# Patient Record
Sex: FEMALE | Race: WHITE
[De-identification: names, ages, dates, MRNs, and addresses within clinical notes are randomized per-mention and may not be internally consistent; named-entity substitution may affect disease eponyms.]

---

## 2018-01-16 ENCOUNTER — HOSPITAL ENCOUNTER (EMERGENCY)
Dept: HOSPITAL 25 - UCCORT | Age: 48
Discharge: HOME | End: 2018-01-16
Payer: COMMERCIAL

## 2018-01-16 ENCOUNTER — HOSPITAL ENCOUNTER (OUTPATIENT)
Dept: HOSPITAL 25 - ED | Age: 48
Discharge: HOME | End: 2018-01-16
Attending: UROLOGY
Payer: COMMERCIAL

## 2018-01-16 VITALS — SYSTOLIC BLOOD PRESSURE: 131 MMHG | DIASTOLIC BLOOD PRESSURE: 88 MMHG

## 2018-01-16 VITALS — SYSTOLIC BLOOD PRESSURE: 146 MMHG | DIASTOLIC BLOOD PRESSURE: 94 MMHG

## 2018-01-16 DIAGNOSIS — R11.0: ICD-10-CM

## 2018-01-16 DIAGNOSIS — N13.2: Primary | ICD-10-CM

## 2018-01-16 DIAGNOSIS — R50.9: ICD-10-CM

## 2018-01-16 DIAGNOSIS — Z32.02: ICD-10-CM

## 2018-01-16 DIAGNOSIS — R03.0: ICD-10-CM

## 2018-01-16 DIAGNOSIS — R10.32: ICD-10-CM

## 2018-01-16 LAB
BASOPHILS # BLD AUTO: 0 10^3/UL (ref 0–0.2)
EOSINOPHIL # BLD AUTO: 0 10^3/UL (ref 0–0.6)
HCT VFR BLD AUTO: 38 % (ref 35–47)
HGB BLD-MCNC: 13 G/DL (ref 12–16)
LYMPHOCYTES # BLD AUTO: 1.1 10^3/UL (ref 1–4.8)
MCH RBC QN AUTO: 30 PG (ref 27–31)
MCHC RBC AUTO-ENTMCNC: 34 G/DL (ref 31–36)
MCV RBC AUTO: 88 FL (ref 80–97)
MONOCYTES # BLD AUTO: 0.5 10^3/UL (ref 0–0.8)
NEUTROPHILS # BLD AUTO: 6.6 10^3/UL (ref 1.5–7.7)
NRBC # BLD AUTO: 0 10^3/UL
NRBC BLD QL AUTO: 0.1
PLATELET # BLD AUTO: 293 10^3/UL (ref 150–450)
RBC # BLD AUTO: 4.34 10^6/UL (ref 4–5.4)
WBC # BLD AUTO: 8.3 10^3/UL (ref 3.5–10.8)

## 2018-01-16 PROCEDURE — G0463 HOSPITAL OUTPT CLINIC VISIT: HCPCS

## 2018-01-16 PROCEDURE — 74018 RADEX ABDOMEN 1 VIEW: CPT

## 2018-01-16 PROCEDURE — 86140 C-REACTIVE PROTEIN: CPT

## 2018-01-16 PROCEDURE — 84702 CHORIONIC GONADOTROPIN TEST: CPT

## 2018-01-16 PROCEDURE — 96376 TX/PRO/DX INJ SAME DRUG ADON: CPT

## 2018-01-16 PROCEDURE — 80053 COMPREHEN METABOLIC PANEL: CPT

## 2018-01-16 PROCEDURE — 36415 COLL VENOUS BLD VENIPUNCTURE: CPT

## 2018-01-16 PROCEDURE — 83605 ASSAY OF LACTIC ACID: CPT

## 2018-01-16 PROCEDURE — 81003 URINALYSIS AUTO W/O SCOPE: CPT

## 2018-01-16 PROCEDURE — 74177 CT ABD & PELVIS W/CONTRAST: CPT

## 2018-01-16 PROCEDURE — 74420 UROGRAPHY RTRGR +-KUB: CPT

## 2018-01-16 PROCEDURE — 85025 COMPLETE CBC W/AUTO DIFF WBC: CPT

## 2018-01-16 PROCEDURE — 99202 OFFICE O/P NEW SF 15 MIN: CPT

## 2018-01-16 PROCEDURE — C1876 STENT, NON-COA/NON-COV W/DEL: HCPCS

## 2018-01-16 PROCEDURE — 99283 EMERGENCY DEPT VISIT LOW MDM: CPT

## 2018-01-16 PROCEDURE — 96374 THER/PROPH/DIAG INJ IV PUSH: CPT

## 2018-01-16 NOTE — RAD
INDICATION: Left renal stent placement



COMPARISON: None

 

TECHNIQUE: 2 views the abdomen were obtained.



FINDINGS: 



The left ureteral stent is anatomically aligned. The proximal most portion of the ureteral

stent is looped within a left lower pole calyx. Overlying the proximal ureter there is a 7

mm calcification corresponding to the numerous calcifications seen on the preoperative

CTA.



IMPRESSION: 

ANATOMIC ALIGNMENT OF LEFT URETERAL STENT. 

INCIDENTALLY NOTED IS THE PROXIMAL PORTION OF THE STENT IS LOOPED WITHIN A LOWER POLE

CALYX.

## 2018-01-16 NOTE — UC
Abdominal Pain Female HPI





- HPI Summary


HPI Summary: 





LEFT SIDE ABDOMINAL PAIN X 4 DAYS


NO RADIATION OF PAIN 


PAIN IS SEVER, WORSE WITH LYING DOWN, 


+ NAUSEA , NO VOMITING, NO URINARY SX








- History of Current Complaint


Chief Complaint: UCAbdominalPain


Stated Complaint: ABD PAIN


Time Seen by Provider: 01/16/18 11:24


Hx Obtained From: Patient


Hx Last Menstrual Period: 1/12/18


Pregnant?: No


Onset/Duration: Gradual Onset, Lasting Days - 4, Still Present


Timing: Constant


Severity Initially: Moderate


Severity Currently: Severe


Location: Discrete At: LLQ


Radiates: No


Character: Colicy


Aggravating Factor(s): Food, Movement


Alleviating Factor(s): Nothing


Associated Signs and Symptoms: Positive: Decreased Appetite, Nausea.  Negative: 

Diaphoresis, Fever, Cough, Chest Pain, Dizzy, Back Pain, Constipation, Blood in 

Stool, Urinary Symptoms, Vaginal Bleeding, Vaginal Discharge, Vomiting, Diarrhea


Allergies/Adverse Reactions: 


 Allergies











Allergy/AdvReac Type Severity Reaction Status Date / Time


 


No Known Allergies Allergy   Verified 01/16/18 11:12











Home Medications: 


 Home Medications





Butalb/Acetamin/Caff TAB* [Fioricet TAB*] 1 tab PO Q6H PRN 01/16/18 [History 

Confirmed 01/16/18]


Digestive Bitters  01/16/18 [History]


Tretinoin [Retin-A] 0.1 % EX 01/16/18 [History]


Vit B12  01/16/18 [History]











PMH/Surg Hx/FS Hx/Imm Hx


Previously Healthy: Yes





- Surgical History


Surgical History: Yes


Surgery Procedure, Year, and Place: UTERINE ABLATION





- Family History


Known Family History: 


   Negative: Blood Disorder





- Social History


Alcohol Use: None


Substance Use Type: None


Smoking Status (MU): Never Smoked Tobacco





Review of Systems


Constitutional: Negative


Skin: Negative


Eyes: Negative


ENT: Negative


Respiratory: Negative


Gastrointestinal: Abdominal Pain, Nausea


Genitourinary: Negative


Is Patient Immunocompromised?: No


All Other Systems Reviewed And Are Negative: Yes





Physical Exam


Triage Information Reviewed: Yes


Appearance: Well-Nourished, Pain Distress


Vital Signs: 


 Initial Vital Signs











Temp  99.6 F   01/16/18 11:15


 


Pulse  78   01/16/18 11:15


 


Resp  16   01/16/18 11:15


 


BP  146/94   01/16/18 11:15


 


Pulse Ox  100   01/16/18 11:15











Vital Signs Reviewed: Yes


Eyes: Positive: Conjunctiva Clear


ENT: Positive: Normal ENT inspection, Hearing grossly normal, Pharynx normal


Neck exam: Normal


Neck: Positive: Supple, Nontender, No Lymphadenopathy


Respiratory: Positive: Chest non-tender, Lungs clear, Normal breath sounds


Cardiovascular: Positive: RRR, No Murmur, Pulses Normal


Abdomen Description: Positive: Soft, Other: - TENDERNESS LLQ.  Negative: CVA 

Tenderness (R), CVA Tenderness (L), Distended, Guarding


Bowel Sounds: Positive: Present


Musculoskeletal: Positive: Strength Intact, ROM Intact, No Edema


Skin Exam: Normal





Diagnostics





- Laboratory


Diagnostic Studies Completed/Ordered: CT ABD/PELVIC.  1. Severe LEFT 

hydronephrosis is traced to a 0.9 cm ureteral pelvic junction stone.  

Associated perinephric inflammatory stranding and delayed LEFT nephrogram and 

pyelogram.  Additional 0.5 cm LEFT mid pole calyceal stone and 0.5 cm lower 

pole calyceal stones.  2. Consider nonemergent RIGHT upper quadrant ultrasound 

to further assess the noted.  relative low suspicion focal hepatic lesions most 

likely representing cysts or.  hemangiomas.





Abd Pain Female Course/Dx





- Course


Course Of Treatment: WILL HAVE THE PT. GO TO [facility] FOR EVAL AND TX





- Differential Dx/Diagnosis


Provider Diagnoses: LEFT RENAL COLIC.  HYDRONEPHROSIS LEFT KIDNEY





Discharge





- Discharge Plan


Condition: Stable


Disposition: HOME


Patient Education Materials:  Renal Colic (ED), Hydronephrosis (ED)


Referrals: 


Rebeca Nieves MD [Primary Care Provider] - 


Additional Instructions: 


CT ABD/ PELVIC :


1. Severe LEFT hydronephrosis is traced to a 0.9 cm ureteral pelvic junction 

stone.


Associated perinephric inflammatory stranding and delayed LEFT nephrogram and 

pyelogram.


Additional 0.5 cm LEFT mid pole calyceal stone and 0.5 cm lower pole calyceal 

stones.


2. Consider nonemergent RIGHT upper quadrant ultrasound to further assess the 

noted


relative low suspicion focal hepatic lesions most likely representing cysts or


hemangiomas.











PLEASE GO TO [facility] FOR EVAL AND TX OF KIDNEY STONE LEFT SIDE , 


LARGE STONE 9 MM , + FLUID IN YOUR LEFT KIDNEY DUE TO OBSTRUCTION

## 2018-01-16 NOTE — RAD
CPT II Codes: 6045F



INDICATION: Left-sided hydronephrosis presence of multiple renal calculi

 

TECHNIQUE: Intraoperative fluoroscopy was provided during retrograde nephrostogram and

left ureteral stent placement.



FINDINGS: 



9 spot films depict retrograde nephrostogram, multiple renal calculi in the proximal

ureter and collecting system as well as moderate to severe hydronephrosis. The proximal

half left ureteral stent is anatomically positioned.



Fluoroscopy time: 10 Seconds



IMPRESSION:  



As above.

## 2018-01-16 NOTE — RAD
INDICATION: RIGHT lower quadrant pain. Question diverticulitis. Nausea and vomiting.

Fever. Bloating.



COMPARISON: No relevant prior exams available on the [facility] PACS for comparison.



TECHNIQUE: Multidetector CT images were obtained from the lung bases to the ischial

tuberosities with 71 mL Omnipaque 300 IV and oral contrast.  Multiplanar reformation.



REPORT: Unremarkable visualized inferior thorax.



1.2 cm maximum dimension sharply circumscribed hypodense lesion at the dome of the RIGHT

posterior hepatic segment and similar morphology 0.7 cm subcapsular lesion at the LEFT

lateral hepatic segment while nonspecific most likely representing cysts or hemangiomas.

Negative for biliary dilatation. No CT abnormality of the gallbladder, pancreas, spleen.



Negative for CT abnormality of the upper GI, small bowel, appendix visualized extending

anterior and cephalad from the cecum with the tip seen at the LEFT parasagittal midline at

the level of the pelvic inlet. Enteric contrast extends to the hepatic flexure of the

colon. No CT abnormality of the colon. Negative for ascites or free air. Small

fat-containing umbilical hernia without inflammatory change.



Normal adrenal glands. Severe LEFT hydronephrosis is traced to a 0.9 cm ureteral pelvic

junction stone. Associated perinephric inflammatory stranding and delayed LEFT nephrogram

and pyelogram. Additional 0.5 cm LEFT mid pole calyceal stone and 0.5 cm lower pole

calyceal stones. Simple cortical cyst lower pole RIGHT kidney. No suspicious focal renal

lesions. Unremarkable ureters and distended urinary bladder as well as the anteverted

uterus and adnexal regions.



Negative for lymphadenopathy. Normal diameter abdominal aorta and iliac arteries.

Physiologic distention of the IVC.



Negative for suspicious osseous lesions.



IMPRESSION: 

1. Severe LEFT hydronephrosis is traced to a 0.9 cm ureteral pelvic junction stone.

Associated perinephric inflammatory stranding and delayed LEFT nephrogram and pyelogram.

Additional 0.5 cm LEFT mid pole calyceal stone and 0.5 cm lower pole calyceal stones. 

2. Consider nonemergent RIGHT upper quadrant ultrasound to further assess the noted

relative low suspicion focal hepatic lesions most likely representing cysts or

hemangiomas.

## 2018-01-16 NOTE — ED
Jose DON Angela, scribed for Tyra Last MD on 01/16/18 at 1556 .





Abdominal Pain/Female





- HPI Summary


HPI Summary: 





This pt is a 46 y/o female presenting to [facility] referred by [facility] for a kidney 

stone. Pt had a CT abdomen/pelvis with oral and IV contrast at Urgent Care and 

was found to have a 9 mm stone at the LEFT UPJ with hydronephrosis. (Note 

indication for the CT states RLQ abd pain, but personal communication with Dr. Hoff and pt confirms that pt's pain is LLQ, and Dr. Hoff's presumptive dx 

was diverticulitis, which is why he ordered the CT with oral and IV contrast). 

She reports she woke up 3 days ago (on early Saturday 1/13/18) with sharp pain, 

got up and thought she was constipated so she went to the bathroom. She took 

motrin and went to sleep that day, but notes she didn't sleep well. Pt woke up 

again at 06:00 AM on Saturday 1/13/18 and vomited 3 times. She states it was 

the "most painful Saturday." Sunday (2 days ago) and Monday (yesterday) she didn

't feel well but notes her pain improved. Today this morning pt went to the gym 

but was not feeling great. At work today she had localized pain, nausea, and 

felt "feverish." Pt decided to go to Urgent Care to check out her pain. Denies 

vomiting today. Today pt had Motrin at around 09:30 am. She has not eaten 

anything all day. She had oral contrast for the CT approx 11:30am. Pt is a 

, and stopped at her workplace after the urgent care before coming 

to the ED, because she had things to take care of.


PMHx none.


LMP: 3 days ago (she notes it only lasted 2 days). Pt had a uterine ablation 

and the bleeding is irregular.


Pt is currently on Retin-A, B12, and multivitamins. 





- History of Current Complaint


Chief Complaint: EDFlankPain


Stated Complaint: LEFT FLANK PAIN


Time Seen by Provider: 01/16/18 15:41


Hx Obtained From: Patient


Hx Last Menstrual Period: pt had an ablation done in Jan. and has not gotten 

one yet


Pregnant?: No


Onset/Duration: Gradual Onset, Lasting Days, Still Present


Timing: Constant


Severity Initially: Moderate


Severity Currently: Mild


Pain Intensity: 0


Location: Discrete At: LLQ, Flank - left


Radiates: No


Character: Sharp


Aggravating Factor(s): Nothing


Alleviating Factor(s): Nothing


Associated Signs and Symptoms: Positive: Fever, Nausea.  Negative: Vomiting


Allergies/Adverse Reactions: 


 Allergies











Allergy/AdvReac Type Severity Reaction Status Date / Time


 


No Known Allergies Allergy   Verified 01/16/18 15:37














PMH/Surg Hx/FS Hx/Imm Hx


Previously Healthy: Yes


Endocrine/Hematology History: 


   Denies: Hx Diabetes


Cardiovascular History: 


   Denies: Hx Hypertension


 History: 


   Denies: Hx Dialysis, Hx Renal Disease





- Surgical History


Surgery Procedure, Year, and Place: UTERINE ABLATION.  A few moles removed.


Infectious Disease History: No


Infectious Disease History: Reports: Hx Shingles


   Denies: Traveled Outside the [place] in Last 30 Days





- Family History


Known Family History: Positive: Cardiac Disease - Mom's side, Respiratory 

Disease - Paternal grandfather: emphysema, Other - Alzheimer's disease


   Negative: Blood Disorder





- Social History


Occupation: Employed Full-time - [employer]


Alcohol Use: Weekly


Substance Use Type: Reports: None


Smoking Status (MU): Never Smoked Tobacco





Review of Systems


Negative: Fever, Chills


Cardiovascular: Negative


Respiratory: Negative


Positive: Abdominal Pain, Nausea.  Negative: Vomiting


Positive: flank pain - left


Skin: Negative


Neurological: Negative


Psychological: Normal


All Other Systems Reviewed And Are Negative: Yes





Physical Exam





- Summary


Physical Exam Summary: 





Appearance: Well-appearing, mild pain distress, Well-nourished, pt sits calmly 

in the exam room, points with one finger at LLQ to indicate the area of pain


Skin: Warm, color reflects adequate perfusion


Head: Normal Head/Face inspection


Eyes: Conjunctiva clear


ENT: Normal ENT inspection


Neck: Supple, no nodes, no JVD


Respiratory: Lungs clear, Normal breath sounds, no respiratory distress


Cardio: RRR, No murmur, pulses normal, brisk capillary refill


Abdomen: soft. Mild tenderness on the left mid abdomen. Left flank tenderness. 

No masses, no guarding, no rebound 


Bowel sounds: present


Musculoskeletal: Strength Intact/ ROM intact. No calf tenderness. No edema.


Neuro: Alert, muscle tone normal, facial symmetry, speech normal, sensory/motor 

intact 


Psychological: Normal


Triage Information Reviewed: Yes


Vital Signs On Initial Exam: 


 Initial Vitals











Temp Pulse Resp BP Pulse Ox


 


 98.6 F   75   16   145/99   100 


 


 01/16/18 15:32  01/16/18 15:32  01/16/18 15:32  01/16/18 15:32  01/16/18 15:32











Vital Signs Reviewed: Yes





Diagnostics





- Vital Signs


 Vital Signs











  Temp Pulse Resp BP Pulse Ox


 


 01/16/18 15:32  98.6 F  75  16  145/99  100














- Laboratory


Lab Results: 


 Lab Results











  01/16/18 01/16/18 01/16/18 Range/Units





  16:28 16:28 16:28 


 


WBC   8.3   (3.5-10.8)  10^3/ul


 


RBC   4.34   (4.0-5.4)  10^6/ul


 


Hgb   13.0   (12.0-16.0)  g/dl


 


Hct   38   (35-47)  %


 


MCV   88   (80-97)  fL


 


MCH   30   (27-31)  pg


 


MCHC   34   (31-36)  g/dl


 


RDW   13   (10.5-15)  %


 


Plt Count   293   (150-450)  10^3/ul


 


MPV   8   (7.4-10.4)  um3


 


Neut % (Auto)   79.8   (38-83)  %


 


Lymph % (Auto)   13.2 L   (25-47)  %


 


Mono % (Auto)   6.1   (1-9)  %


 


Eos % (Auto)   0.5   (0-6)  %


 


Baso % (Auto)   0.4   (0-2)  %


 


Absolute Neuts (auto)   6.6   (1.5-7.7)  10^3/ul


 


Absolute Lymphs (auto)   1.1   (1.0-4.8)  10^3/ul


 


Absolute Monos (auto)   0.5   (0-0.8)  10^3/ul


 


Absolute Eos (auto)   0   (0-0.6)  10^3/ul


 


Absolute Basos (auto)   0   (0-0.2)  10^3/ul


 


Absolute Nucleated RBC   0   10^3/ul


 


Nucleated RBC %   0.1   


 


Sodium  136    (133-145)  mmol/L


 


Potassium  3.9    (3.5-5.0)  mmol/L


 


Chloride  101    (101-111)  mmol/L


 


Carbon Dioxide  28    (22-32)  mmol/L


 


Anion Gap  7    (2-11)  mmol/L


 


BUN  17    (6-24)  mg/dL


 


Creatinine  1.26 H    (0.51-0.95)  mg/dL


 


Est GFR ( Amer)  58.8    (>60)  


 


Est GFR (Non-Af Amer)  45.7    (>60)  


 


BUN/Creatinine Ratio  13.5    (8-20)  


 


Glucose  84    ()  mg/dL


 


Lactic Acid    1.5  (0.5-2.0)  mmol/L


 


Calcium  9.3    (8.6-10.3)  mg/dL


 


Total Bilirubin  0.80    (0.2-1.0)  mg/dL


 


AST  16    (13-39)  U/L


 


ALT  8    (7-52)  U/L


 


Alkaline Phosphatase  46    ()  U/L


 


C-Reactive Protein  14.72 H    (< 5.00)  mg/L


 


Total Protein  7.2    (6.4-8.9)  g/dL


 


Albumin  4.2    (3.2-5.2)  g/dL


 


Globulin  3.0    (2-4)  g/dL


 


Albumin/Globulin Ratio  1.4    (1-3)  


 


Beta HCG, Quant  < 0.60    mIU/mL











Result Diagrams: 


 01/16/18 16:28





 01/16/18 16:28


Lab Statement: Any lab studies that have been ordered have been reviewed, and 

results considered in the medical decision making process.





- CT


  ** Abdomen/Pelvis CT


CT Interpretation: Positive (See Comments) - IMPRESSION: 1. Severe LEFT 

hydronephrosis is traced to a 0.9 cm ureteral pelvic junction stone. Associated 

perinephric inflammatory stranding and delayed LEFT nephrogram and pyelogram. 

Additional 0.5 cm LEFT mid pole calyceal stone and 0.5 cm lower pole calyceal 

stones. 2. Consider nonemergent RIGHT upper quadrant ultrasound to further 

assess the noted relative low suspicion focal hepatic lesions most representing 

cysts or hemangiomas. Dr. Last has reviewed this radiology report.


CT Interpretation Completed By: Radiologist





Re-Evaluation





- Re-Evaluation


  ** First Eval


Re-Evaluation Time: 16:37


Comment: I discussed plan for OR admission with the pt, for Dr. Valiente to place 

a ureteral stent.  Pt is agreeable.





Abdominal Pain Fem Course/Dx





- Course


Course Of Treatment: Pt medications reviewed this visit. High blood pressure 

noted.  Abdomen/pelvis CT with oral and IV contrast done at [facility], shows 

severe LEFT hydronephrosis is traced to a 0.9 cm ureteral pelvic junction 

stone. Associated perinephric inflammatory stranding and delayed LEFT 

nephrogram and pyelogram. Pt with 2 additional calyceal stones.  Pt is afebrile

, wbc count is normal and urine does not appear infected by UA.  Pt is also 

made aware of additional findings regarding her liver and the need for 

outpatient elective US for further evaluation.  I discussed pt care with Dr. Valiente, urologist, who will take the pt to the OR for a stent. He wanted to know 

the pt's NPO status. Pt states the last time she ate was yesterday and the last 

time she had something to drink was today at noon (CT contrast).  Pt is to be 

NPO from now on. Pt will be given Ceftriaxone 2 grams IV.





- Diagnoses


Differential Diagnosis: Positive: Bowel Obstruction, Diverticulitis, Renal Colic

, Urinary Tract Infection


Provider Diagnoses: 


 Elevated blood pressure reading without diagnosis of hypertension, 

Hydronephrosis, Left UPJ stone








- Provider Notifications


Discussed Care Of Patient With: Eduardo Valiente


Time Discussed With Above Provider: 16:30


Instructed by Provider To: Other - I discussed pt care with Dr. Valiente, urologist

, who will take the pt to the OR today for a stent. He wants to know the pt's 

NPO status.





Discharge





- Discharge Plan


Condition: Stable


Disposition: ADMITTED TO [facility]


Discharge Disposition Comment: by Dr. Valiente to the OR.





The documentation as recorded by the Jose blakely Angela accurately reflects 

the service I personally performed and the decisions made by me, Tyra Last MD.

## 2018-01-16 NOTE — HP
CC:  Dr. Rebeca Nieves *

 

ADMITTING HISTORY AND PHYSICAL:

 

DATE OF ADMISSION:  01/16/18

 

ADMITTING DIAGNOSES:

1.  Calculus left ureteropelvic junction.

2.  Severe left hydronephrosis.

3.  Additional left renal calculi.

 

PLANNED PROCEDURE:  Today is left stent insertion (to be followed in near 
future by lithotripsy).

 

SURGEON:  Dr. Valiente

 

HISTORY OF PRESENT ILLNESS:  Judy Donnelly is a 47-year-old 
, who was transferred from the [facility] because of finding of 
severe left hydronephrosis secondary to a fairly large calculus at the left 
ureteropelvic junction.  She is being brought in for urgent left stent 
insertion to be followed in the near future by lithotripsy.

 

PAST MEDICAL HISTORY:  Significant for acne.

 

MEDICATIONS ON ADMISSION:  Retin-A for acne.

 

ALLERGIES:  No known drug allergies.

 

REVIEW OF SYSTEMS:  She denies any chest pain or shortness of breath.  There is 
no history of diabetes mellitus or any other major systemic illness.

 

                               PHYSICAL EXAMINATION

 

GENERAL:  Reveals a pleasant healthy-appearing lady.

 

VITAL SIGNS:  Blood pressure is 115/73, pulse 75 per minute and regular, 
temperature 37 degrees, respirations 16 per minute, oxygen saturation 100% on 
room air.

 

LUNGS:  Clear bilaterally.

 

CARDIOVASCULAR:  Regular rate and rhythm.  S1, S2.

 

ABDOMEN:  Soft with left flank tenderness.

 

 DIAGNOSTIC STUDIES/LABORATORY DATA:  I reviewed the CT scan, which reveals a 
fairly large at least 10 to 12 mm calculus at the left ureteropelvic junction 
with additional left renal calculi.

 

IMPRESSION:  I discussed the situation in detail with Dr. Yogesh Donnelly.  I also 
explained the possibility of this being related to a congenital ureteropelvic 
junction obstruction although that will be difficult to delineate because of 
the presence of the large calculus at the UPJ at the present time.

 

I also explained the procedure of left stent insertion (to be followed by 
lithotripsy) and explained that there is a small chance that I may not be able 
to safely place the stent because of the very large size of the calculus, in 
that case she would require a temporary left nephrostomy tube to be followed by 
lithotripsy.

 

PLAN:  Planned procedure is left stent insertion (to be followed by shockwave 
lithotripsy).

 

 

 

[id number]/[facility] #: [id number]

[facility]

## 2018-01-17 NOTE — OP
CC:  Rebeca Nieves MD *

 

DATE OF OPERATION:  01/16/18 - SDS

 

DATE OF BIRTH:  [birth year]

 

SURGEON:  Dr. Valiente.

 

ANESTHESIA:  General.

 

ANESTHESIOLOGIST:  Dr. Ferro.

 

PRE-OP DIAGNOSES:

1.  Severe left hydronephrosis.

2.  Obstructing calculus, left ureteropelvic junction.

3.  Left renal calculi.

 

POST-OP DIAGNOSES:

1.  Severe left hydronephrosis.

2.  Obstructing calculus, left ureteropelvic junction.

3.  Left renal calculi.

 

OPERATIVE PROCEDURE:  Cystoscopy, left retrograde pyelogram, left ureteral 
calculus manipulation, and left stent insertion.

 

COMPLICATIONS:  None.

 

POSTOPERATIVE CONDITION:  Stable.

 

STENT USED:  6-Stateless stent, left ureter.

 

OPERATIVE FINDINGS:  High-grade obstruction secondary to large calculus 
impacted at left ureteropelvic junction.

 

INDICATIONS:  Judy Donnelly is a 47-year-old lady who was evaluated for 
obstructing calculus of the left uretero-pelvic junction.  In addition, she has 
2 left renal calculi and is now being brought in for urgent left stent 
insertion because of the findings of severe left hydronephrosis and the large 
calculus.  She will require lithotripsy in the near future.

 

DESCRIPTION OF PROCEDURE:  After induction of general anesthesia, the patient 
was placed in dorsal lithotomy position.  Sequential compression devices were 
in place and functioning.  Initial cystoscopy revealed a normal-appearing 
bladder with some changes suggestive of cystitis noted throughout the bladder 
wall.  A guidewire was introduced into the left ureter.  Retrograde pyelogram 
revealed limited visualization due to persistent oral and intravenous contrast 
from the prior CT scan.  Left hydronephrosis was demonstrated.  The open-ended 
catheter was advanced to the level of the ureteropelvic junction, so the 
calculus could be manipulated proximally, which was done.  Once this was done, 
I was able to place a 6-Stateless stent under fluoroscopic monitoring with good 
proximal and distal positioning obtained.  The bladder was emptied.  The 
patient tolerated the procedure satisfactorily and was transferred back to the 
recovery area in stable condition.

 

 [id number]/[facility] #: [id number]

[facility]

## 2018-01-29 ENCOUNTER — HOSPITAL ENCOUNTER (OUTPATIENT)
Dept: HOSPITAL 25 - OR | Age: 48
Discharge: HOME | End: 2018-01-29
Attending: UROLOGY
Payer: COMMERCIAL

## 2018-01-29 VITALS — SYSTOLIC BLOOD PRESSURE: 114 MMHG | DIASTOLIC BLOOD PRESSURE: 73 MMHG

## 2018-01-29 DIAGNOSIS — N13.2: Primary | ICD-10-CM

## 2018-01-29 PROCEDURE — 74018 RADEX ABDOMEN 1 VIEW: CPT

## 2018-01-29 PROCEDURE — 81025 URINE PREGNANCY TEST: CPT

## 2018-01-29 NOTE — RAD
INDICATION:  Renal calculi.



COMPARISON:  Correlation is made with a prior CT of the abdomen and pelvis from January 16, 2018 and a prior KUB from January 29, 2018 from 5 hours earlier.



TECHNIQUE: Frontal supine films of the abdomen were obtained.



FINDINGS: The small bowel and colon appear nondistended. 



There is a left ureteral stent catheter present which appears unchanged. There are

multiple left renal calculi. The previously noted dominant calculus at the left

ureteropelvic junction appears fragmented.



IMPRESSION:  MULTIPLE LEFT RENAL CALCULI AS NOTED.

## 2018-01-29 NOTE — RAD
INDICATION: ESWL     



COMPARISON: January 16, 2018

 

TECHNIQUE: A single view of the abdomen is submitted.



FINDINGS: 



Bones: There are no acute bony findings.



Soft tissues: The soft tissues appear normal. The psoas margins are sharp.



Bowel gas pattern: Normal



Calcifications: There are multiple left-sided renal calculi. The dominant calculus is near

the UVJ and measures 9 mm. Upper and lower pole calculi are also present.



Other: There is a left ureteral stent in expected position



IMPRESSION: LEFT-SIDED NEPHROLITHIASIS

## 2018-01-30 NOTE — OP
CC: Dr. Rebeca Nieves *

 

DATE OF OPERATION:  01/29/18 - SDS

 

DATE OF BIRTH:  [birth year]

 

SURGEON:  Dr. Valiente.

 

ANESTHESIOLOGIST:  Dr. Cooper.

 

ANESTHESIA:  General.

 

PRE-OP DIAGNOSES:  Left ureteral calculus and left renal calculi.

 

POST-OP DIAGNOSES:  Left ureteral calculus and left renal calculi.

 

OPERATIVE PROCEDURE:

1.  Shock wave lithotripsy of left ureteral calculus.

2.  Shock wave lithotripsy of left renal calculi.

 

COMPLICATIONS:  None.

 

PREOPERATIVE CONDITION:  Stable.

 

INDICATIONS:  Judy Donnelly is a 47-year-old lady who had undergone 
urgent left stent insertion because of a large obstructing calculus in the left 
ureteropelvic junction.  In addition, she has left renal calculi and is now 
being brought in for shock wave lithotripsy.

 

DESCRIPTION OF PROCEDURE:  After induction of general anesthesia, the patient 
was placed on the lithotripsy table in supine position.  The dominant calculus 
in the area of the left urethropelvic junction was first localized using 
fluoroscopy. Shock wave lithotripsy was commenced at a rate of 90 shocks per 
minute.  Periodic imaging revealed good localization and fragmentation and a 
total of 1800 shocks were delivered to this calculus. Next attention was 
directed to the calculi in the left kidney. These were separately identified 
using fluoroscopy and were targeted with shock wave lithotripsy at a rate of 60 
shocks per minute.  800 shocks were delivered to these two calculi together and 
good fragmentation was observed.  The patient tolerated the procedure 
satisfactorily and was transferred back to the recovery area in stable 
condition.

 

 [id number]/CPS #: [id number]

MTDD

## 2020-02-17 ENCOUNTER — HOSPITAL ENCOUNTER (EMERGENCY)
Dept: HOSPITAL 25 - UCCORT | Age: 50
Discharge: HOME | End: 2020-02-17
Payer: COMMERCIAL

## 2020-02-17 VITALS — SYSTOLIC BLOOD PRESSURE: 134 MMHG | DIASTOLIC BLOOD PRESSURE: 78 MMHG

## 2020-02-17 DIAGNOSIS — N39.0: Primary | ICD-10-CM

## 2020-02-17 PROCEDURE — 87086 URINE CULTURE/COLONY COUNT: CPT

## 2020-02-17 PROCEDURE — G0463 HOSPITAL OUTPT CLINIC VISIT: HCPCS

## 2020-02-17 PROCEDURE — 87186 SC STD MICRODIL/AGAR DIL: CPT

## 2020-02-17 PROCEDURE — 99212 OFFICE O/P EST SF 10 MIN: CPT

## 2020-02-17 PROCEDURE — 87077 CULTURE AEROBIC IDENTIFY: CPT

## 2020-02-17 PROCEDURE — 81003 URINALYSIS AUTO W/O SCOPE: CPT

## 2020-02-17 NOTE — XMS REPORT
Continuity of Care Document (CCD)

 Created on:January 3, 2020



Patient:Judy Humphries

Sex:Female

:1970

External Reference #:[id number]





Demographics







 Address  [address] [place] [address]

 

 Mobile Phone  [phone]

 

 Work Phone  [phone]

 

 Email Address  [email or web address]

 

 Preferred Language  en

 

 Marital Status  Not  or 

 

 Sabianist Affiliation  Unknown

 

 Race  White

 

 Ethnic Group  Not  or 









Author







 Name  Mili Bellamy PA

 

 Address  [address]



   [place] [address]









Care Team Providers







 Name  Role  Phone

 

 Azam Lopes MD - Urology  Care Team Information   [phone]









Problems







 Active Problems  Provider  Date

 

 Vitamin D deficiency  Rebeca Nieves MD  Onset: 2018

 

 Migraine with typical aura  Rebeca Nieves MD  Onset: 2018

 

 Acne  Rebeca Nieves MD  Onset: 2019







Social History







 Type  Date  Description  Comments

 

 Birth Sex    Unknown  

 

 Tobacco Use  Start: Unknown  Never Smoked Cigarettes  

 

 ETOH Use    Occasionally consumes alcohol  

 

 Tobacco Use  Start: Unknown  Patient has never smoked  

 

 Smoking Status  Reviewed: 19  Patient has never smoked  







Allergies, Adverse Reactions, Alerts







 Description

 

 No Known Drug Allergies







Medications







 Active Medications  SIG  Qnty  Indications  Ordering Provider  Date

 

 Retin-A  apply to face  45gm  L70.0  Rebeca Nieves,  2019



       0.1% Cream  once daily      MD  



           

 

 Ondansetron  1 po at onset of  60tabs  G43.109  Rebeca Nieves,  2019



           8mg Tablets  nausea every 8      MD  



 Dispers  hours prn        



           

 

 Butalbital/Acetaminop  1 tap by mouth q2  60tabs  G43.109  Rebeca Nieves,  



 hen/Caffeine  hours as needed      MD  



   headache        



 -40mg Tablets          



           

 

 Magnesium Citrate  not sure on dose      Rebeca Nieves,  2019



                 100mg        MD  



 Tablets          



           

 

 Multivitamin Adult  1 by mouth every      Unknown  



   day        



 Tablets          



           

 

 Calcium Carbonate  2 by mouth daily      Unknown  



                 500mg          



 Chewtabs          



           

 

 Vitamin D  1 by mouth every      Unknown  



         1000Unit  day        



 Tablets          



           







Immunizations







 CPT Code  Status  Date  Vaccine  Reaction  Lot #

 

 [id number]  Given  2012  Tdap (Adacel) Ages 7 And Above  VIS DATE 08  



       Only    









 









 21429  Refused  2018  Afluria Or Fluvirin Flu Vac Intramuscular    







Vital Signs







 Date  Vital  Result  Comment

 

 2020  8:19am  Body Temperature  99.5 F  tympanic









 Weight  129.00 lb  

 

 Heart Rate  72 /min  

 

 BP Systolic  126 mmHg  

 

 BP Diastolic  80 mmHg  

 

 Respiratory Rate  16 /min  

 

 Height  62.50 inches  5'2.50"

 

 BMI (Body Mass Index)  23.2 kg/m2  









 2019  1:11pm  Weight  123.00 lb  









 Heart Rate  78 /min  

 

 BP Systolic  108 mmHg  

 

 BP Diastolic  68 mmHg  

 

 Respiratory Rate  18 /min  

 

 Height  62.50 inches  5'2.50"

 

 O2 % BldC Oximetry  98 %  Ra

 

 BMI (Body Mass Index)  22.1 kg/m2  







Results







 Test  Acquired Date  Facility  Test  Result  H/L  Range  Note

 

 Laboratory test finding  2020  [facility]  <pending>      







Procedures







 Date  Code  Description  Status

 

 2018  06359385  Mammogram  Completed

 

 2017  38276831  Mammogram  Completed

 

 2015  76685642  Mammogram  Completed







Medical Devices







 Description

 

 No Information Available







Encounters







 Type  Date  Location  Provider  Dx  Diagnosis

 

 Office Visit  2020  8:15a  [facility]  Mili Bellamy PA  L60.1  Onycholysis







Assessments







 Date  Code  Description  Provider

 

 2020  L60.1  Onycholysis  Mili Bellamy PA







Plan of Treatment

Future Appointment(s):2020  1:00 pm - Rebeca Nieves MD at [facility]2020 
- Mili Bellamy PAL60.1 OnycholysisComments:BL great toes with white line 
distally, L great toe with line laterally ? etiology - may be result of certain 
shoes, viral infectionThis should slowly grow out with time, watch for healthy 
nail at the bases, can take several monthsSuggest filing nails as needed, 
avoiding trauma/irritants/cosmetics, keeping nails dryCall with questions/
concernsFollow up:Prn



Functional Status







 Description

 

 No Information Available







Mental Status







 Description

 

 No Information Available







Referrals







 Description

 

 No Information Available

## 2020-02-17 NOTE — XMS REPORT
Continuity of Care Document (CCD)

 Created on:2020



Patient:Judy Taylor

Sex:Female

:1970

External Reference #:[id number]





Demographics







 Address  [address] [place] [address]

 

 Mobile Phone  [phone]

 

 Work Phone  [phone]

 

 Email Address  [email or web address]

 

 Preferred Language  en

 

 Marital Status  Not  or 

 

 Advent Affiliation  Unknown

 

 Race  White

 

 Ethnic Group  Not  or 









Author







 Name  Jose Felix M.D

 

 Address  [address] [place] [address]









Care Team Providers







 Name  Role  Phone

 

 Rebeca Nieves MD  Care Team Information   [phone]

 

 Rebeca Nieves MD - Family Medicine  Care Team Information   [phone]
[phone]









Problems







 Description

 

 No Information Available







Social History







 Type  Date  Description  Comments

 

 Birth Sex    Unknown  

 

 Tobacco Use  Start: Unknown  Never Smoked Cigarettes  

 

 Smoking Status  Reviewed: 20  Never Smoked Cigarettes  

 

 ETOH Use    Current Alcohol Use - 1-3 Days A  



     Week.  

 

 Recreational Drug Use    Never Used Drugs  







Allergies, Adverse Reactions, Alerts







 Description

 

 No Known Drug Allergies







Medications







 Active Medications  SIG  Qnty  Indications  Ordering Provider  Date

 

 Retin-A                     0.05%        Unknown  



 Cream          

 

 Ondansetron                     4mg        Unknown  



 Tablets Dispers          







Immunizations







 Description

 

 No Information Available







Vital Signs







 Date  Vital  Result  Comment

 

 2020  8:06am  Weight  123.00 lb  









 Height  62 inches  5'2"

 

 BMI (Body Mass Index)  22.5 kg/m2  

 

 BP Systolic  123 mmHg  

 

 BP Diastolic  85 mmHg  

 

 Heart Rate  78 /min  

 

 O2 % BldC Oximetry  99 %  

 

 Body Temperature  98.1 F  

 

 [place] Score  9  

 

 Neck Circumference in inches  12.5  

 

 Pain Level  0  







Results







 Description

 

 No Information Available







Procedures







 Description

 

 No Information Available







Medical Devices







 Description

 

 No Information Available







Encounters







 Description

 

 No Information Available







Assessments







 Description

 

 No Information Available







Plan of Treatment

No Information Available



Functional Status







 Description

 

 No Information Available







Mental Status







 Description

 

 No Information Available







Referrals







 Description

 

 No Information Available

## 2020-02-17 NOTE — XMS REPORT
Continuity of Care Document (CCD)

 Created on:2020



Patient:Judy Taylor

Sex:Female

:1970

External Reference #:[id number]





Demographics







 Address  [address] [place] [address]

 

 Mobile Phone  [phone]

 

 Work Phone  [phone]

 

 Email Address  [email or web address]

 

 Preferred Language  en

 

 Marital Status  Not  or 

 

 Holiness Affiliation  Unknown

 

 Race  White

 

 Ethnic Group  Not  or 









Author







 Name  Jose Felix M.D (transmitted by agent of provider Phylicia Patel)

 

 Address  [address] [place] [address]









Care Team Providers







 Name  Role  Phone

 

 Rebeca Nieves MD  Care Team Information   [phone]

 

 Rebeca Nieves MD - Family Medicine  Care Team Information   [phone]
[phone]









Problems







 Description

 

 No Information Available







Social History







 Type  Date  Description  Comments

 

 Birth Sex    Unknown  

 

 Tobacco Use  Start: Unknown  Never Smoked Cigarettes  

 

 ETOH Use    Current Alcohol Use - 1-3 Days A Week.  

 

 Recreational Drug Use    Never Used Drugs  







Allergies, Adverse Reactions, Alerts







 Description

 

 No Known Drug Allergies







Medications







 Active Medications  SIG  Qnty  Indications  Ordering Provider  Date

 

 Retin-A                     0.05%        Unknown  



 Cream          

 

 Ondansetron                     4mg        Unknown  



 Tablets Dispers          







Immunizations







 Description

 

 No Information Available







Vital Signs







 Date  Vital  Result  Comment

 

 2020  8:06am  Weight  123.00 lb  









 Height  62 inches  5'2"

 

 BMI (Body Mass Index)  22.5 kg/m2  

 

 BP Systolic  123 mmHg  

 

 BP Diastolic  85 mmHg  

 

 Heart Rate  78 /min  

 

 O2 % BldC Oximetry  99 %  

 

 Body Temperature  98.1 F  

 

 [place] Score  9  

 

 Neck Circumference in inches  12.5  

 

 Pain Level  0  







Results







 Description

 

 No Information Available







Procedures







 Description

 

 No Information Available







Medical Devices







 Description

 

 No Information Available







Encounters







 Description

 

 No Information Available







Assessments







 Description

 

 No Information Available







Plan of Treatment

No Information Available



Functional Status







 Description

 

 No Information Available







Mental Status







 Description

 

 No Information Available







Referrals







 Description

 

 No Information Available

## 2020-02-17 NOTE — UC
Complaint Female HPI





- HPI Summary


HPI Summary: 


50-year-old woman comes in with a chief complaint of burning with urination 

urinary frequency for 2 days.  Started to have some chills today and not 

feeling well.  Patient does have a history of kidney stones she does not feel 

like there is any kidney stone going on she does not have any flank pain or 

abdominal pain.  The symptoms remind her of urinary tract infection.





- History Of Current Complaint


Chief Complaint: UCGU


Stated Complaint: URINARY


Time Seen by Provider: 02/17/20 19:18


Hx Last Menstrual Period: 2/10/20


Pain Intensity: 0





- Allergies/Home Medications


Allergies/Adverse Reactions: 


 Allergies











Allergy/AdvReac Type Severity Reaction Status Date / Time


 


No Known Allergies Allergy   Verified 02/17/20 18:49














PMH/Surg Hx/FS Hx/Imm Hx


Previously Healthy: Yes


GI/ History: Kidney Stones





- Surgical History


Surgical History: Yes


Surgery Procedure, Year, and Place: UTERINE ABLATION- 2014- [facility].  

lithotripsy r/t kidney stones 2018





- Family History


Known Family History: Positive: Cardiac Disease - Mom's side, Respiratory 

Disease - Paternal grandfather: emphysema, Other - Alzheimer's disease


   Negative: Blood Disorder





- Social History


Alcohol Use: Occasionally


Substance Use Type: None


Smoking Status (MU): Never Smoked Tobacco


Have You Smoked in the Last Year: No





Review of Systems


All Other Systems Reviewed And Are Negative: Yes


Constitutional: Positive: Other - SEE HPI


Skin: Positive: Negative


Eyes: Positive: Negative


ENT: Positive: Negative


Respiratory: Positive: Negative


Cardiovascular: Positive: Palpitations


Gastrointestinal: Positive: Negative


Genitourinary: Positive: Dysuria, Frequency, Urgency


Motor: Positive: Negative


Neurovascular: Positive: Negative


Musculoskeletal: Positive: Negative


Neurological/Mental Status: Positive: Negative


Psychological: Positive: Negative


Is Patient Immunocompromised?: No





Physical Exam


Triage Information Reviewed: Yes


Appearance: No Pain Distress, Well-Nourished, Ill-Appearing - MILD


Vital Signs: 


 Initial Vital Signs











Temp  98.1 F   02/17/20 18:50


 


Pulse  86   02/17/20 18:50


 


Resp  15   02/17/20 18:50


 


BP  134/78   02/17/20 18:50


 


Pulse Ox  100   02/17/20 18:50











Vital Signs Reviewed: Yes


Eye Exam: Normal


Eyes: Positive: Conjunctiva Clear


Neck: Positive: Supple


Respiratory: Positive: Lungs clear, Normal breath sounds, No respiratory 

distress


Cardiovascular: Positive: RRR


Abdomen Description: Positive: Nontender, Soft.  Negative: CVA Tenderness (R), 

CVA Tenderness (L)


Musculoskeletal: Positive: Strength Intact, ROM Intact


Neurological: Positive: Alert, Muscle Tone Normal


Psychological: Positive: Age Appropriate Behavior


Skin Exam: Normal





 Complaint Female Dx





- Differential Dx/Diagnosis


Provider Diagnosis: 


 UTI (urinary tract infection)








Discharge ED





- Sign-Out/Discharge


Documenting (check all that apply): Patient Departure


All imaging exams completed and their final reports reviewed: No Studies





- Discharge Plan


Condition: Stable


Disposition: HOME


Prescriptions: 


Cephalexin CAP* [Keflex CAP*] 500 mg PO TID #20 cap


Phenazopyridine TAB* [Pyridium 100 mg TAB*] 100 mg PO TID PRN #8 tab


 PRN Reason: Pain - Mild


Patient Education Materials:  Urinary Tract Infection in Women (ED)


Referrals: 


Rebeca Nieves MD [Primary Care Provider] - 


Additional Instructions: 


FOLLOW UP WITH YOUR DOCTOR IF NOT COMPLETELY IMPROVED.


GO TO THE EMERGENCY DEPARTMENT IF WORSE; PAIN, FEVER, YOU FEEL ILL, SYMPTOMS OF 

A KIDNEY STONE OR ANY QUESTIONS OR CONCERNS.








- Billing Disposition and Condition


Condition: STABLE


Disposition: Home